# Patient Record
Sex: FEMALE | Race: WHITE
[De-identification: names, ages, dates, MRNs, and addresses within clinical notes are randomized per-mention and may not be internally consistent; named-entity substitution may affect disease eponyms.]

---

## 2019-10-25 ENCOUNTER — HOSPITAL ENCOUNTER (EMERGENCY)
Dept: HOSPITAL 74 - JER | Age: 34
Discharge: HOME | End: 2019-10-25
Payer: COMMERCIAL

## 2019-10-25 VITALS — TEMPERATURE: 98.2 F | SYSTOLIC BLOOD PRESSURE: 122 MMHG | HEART RATE: 88 BPM | DIASTOLIC BLOOD PRESSURE: 78 MMHG

## 2019-10-25 VITALS — BODY MASS INDEX: 34.9 KG/M2

## 2019-10-25 DIAGNOSIS — Z3A.01: ICD-10-CM

## 2019-10-25 DIAGNOSIS — O02.1: ICD-10-CM

## 2019-10-25 DIAGNOSIS — O10.911: ICD-10-CM

## 2019-10-25 DIAGNOSIS — O26.891: Primary | ICD-10-CM

## 2019-10-25 LAB
ALBUMIN SERPL-MCNC: 3.6 G/DL (ref 3.4–5)
ALP SERPL-CCNC: 59 U/L (ref 45–117)
ALT SERPL-CCNC: 19 U/L (ref 13–61)
ANION GAP SERPL CALC-SCNC: 7 MMOL/L (ref 8–16)
APPEARANCE UR: CLEAR
AST SERPL-CCNC: 16 U/L (ref 15–37)
BASOPHILS # BLD: 0.4 % (ref 0–2)
BILIRUB SERPL-MCNC: 0.3 MG/DL (ref 0.2–1)
BILIRUB UR STRIP.AUTO-MCNC: NEGATIVE MG/DL
BUN SERPL-MCNC: 3.6 MG/DL (ref 7–18)
CALCIUM SERPL-MCNC: 8.9 MG/DL (ref 8.5–10.1)
CHLORIDE SERPL-SCNC: 105 MMOL/L (ref 98–107)
CO2 SERPL-SCNC: 24 MMOL/L (ref 21–32)
COLOR UR: YELLOW
CREAT SERPL-MCNC: 0.6 MG/DL (ref 0.55–1.3)
DEPRECATED RDW RBC AUTO: 13.6 % (ref 11.6–15.6)
EOSINOPHIL # BLD: 0.3 % (ref 0–4.5)
GLUCOSE SERPL-MCNC: 118 MG/DL (ref 74–106)
HCT VFR BLD CALC: 38.4 % (ref 32.4–45.2)
HGB BLD-MCNC: 12.9 GM/DL (ref 10.7–15.3)
KETONES UR QL STRIP: NEGATIVE
LEUKOCYTE ESTERASE UR QL STRIP.AUTO: NEGATIVE
LYMPHOCYTES # BLD: 18.9 % (ref 8–40)
MCH RBC QN AUTO: 30.9 PG (ref 25.7–33.7)
MCHC RBC AUTO-ENTMCNC: 33.5 G/DL (ref 32–36)
MCV RBC: 92.2 FL (ref 80–96)
MONOCYTES # BLD AUTO: 6.1 % (ref 3.8–10.2)
NEUTROPHILS # BLD: 74.3 % (ref 42.8–82.8)
NITRITE UR QL STRIP: NEGATIVE
PH UR: >= 9 [PH] (ref 5–8)
PLATELET # BLD AUTO: 230 K/MM3 (ref 134–434)
PMV BLD: 9.3 FL (ref 7.5–11.1)
POTASSIUM SERPLBLD-SCNC: 4 MMOL/L (ref 3.5–5.1)
PROT SERPL-MCNC: 6.8 G/DL (ref 6.4–8.2)
PROT UR QL STRIP: (no result)
PROT UR QL STRIP: NEGATIVE
RBC # BLD AUTO: 4.17 M/MM3 (ref 3.6–5.2)
SODIUM SERPL-SCNC: 136 MMOL/L (ref 136–145)
SP GR UR: 1.02 (ref 1.01–1.03)
UROBILINOGEN UR STRIP-MCNC: 0.2 MG/DL (ref 0.2–1)
WBC # BLD AUTO: 11.7 K/MM3 (ref 4–10)

## 2019-10-25 PROCEDURE — 3E0337Z INTRODUCTION OF ELECTROLYTIC AND WATER BALANCE SUBSTANCE INTO PERIPHERAL VEIN, PERCUTANEOUS APPROACH: ICD-10-PCS | Performed by: EMERGENCY MEDICINE

## 2019-10-25 PROCEDURE — 3E033NZ INTRODUCTION OF ANALGESICS, HYPNOTICS, SEDATIVES INTO PERIPHERAL VEIN, PERCUTANEOUS APPROACH: ICD-10-PCS | Performed by: EMERGENCY MEDICINE

## 2019-10-25 NOTE — PDOC
History of Present Illness





- General


History Source: Patient,  Used ([facility] int #[id number])


Exam Limitations: Clinical Condition





- History of Present Illness


Initial Comments: 





10/25/19 11:50


Patient with past medical history of hypertension on nifedipine and left  

oophorectomy due to left ovarian cyst 5 years ago presented with complaint of 

right lower quadrant and epigastric pain which has been persistent since 

yesterday with a positive pregnancy test.  Patient reported LMP  which 

put her 6 weeks pregnant.  Denies vaginal bleeding, nausea, vomiting, dizziness

, fever or chills.  Patient did not take anything for symptoms.  Denies any 

other symptoms


Is this a multiple visit Asthma Patient?: No


Timing/Duration: other (2 days)





<Arpit Dougherty - Last Filed: 10/25/19 14:54>





<Kyle Lassiter - Last Filed: 10/25/19 16:29>





- General


Chief Complaint: Pain


Stated Complaint: 6 WK PREG / ABD PAIN


Time Seen by Provider: 10/25/19 11:15





Past History





- Past Medical History


COPD: No


HTN: Yes





- Psycho Social/Smoking Cessation Hx


Smoking History: Never smoked





<Arpit Dougherty - Last Filed: 10/25/19 14:54>





<Kyle Lassiter - Last Filed: 10/25/19 16:29>





- Past Medical History


Allergies/Adverse Reactions: 


 Allergies











Allergy/AdvReac Type Severity Reaction Status Date / Time


 


No Known Allergies Allergy   Verified 10/25/19 11:06











Home Medications: 


Ambulatory Orders





Nifedipine [Procardia Xl] 30 mg PO DAILY 10/25/19 


Prenat 115/Iron Fum/Folic/Dss [Prenatal 19 Tablet] 1 each PO DAILY 10/25/19 











**Review of Systems





- Review of Systems


Able to Perform ROS?: Yes


Is the patient limited English proficient: No


Constitutional: No: Fever, Malaise, Weakness


HEENTM: No: Symptoms Reported, See HPI, Eye Pain, Blurred Vision, Tearing, 

Recent change in vision, Double Vision, Cataracts, Ear Pain, Ocular Prothesis, 

Ear Discharge, Nose Pain, Nose Congestion, Tinnitus, Nose Bleeding, Hearing Loss

, Throat Pain, Throat Swelling, Mouth Pain, Dental Problems, Difficulty 

Swallowing, Mouth Swelling, Other


Respiratory: No: Symptoms reported, See HPI, Cough, Orthopnea, Shortness of 

Breath, SOB with Exertion, SOB at Rest, Stridor, Wheezing, Productive cough, 

Hemoptysis, Other


Cardiac (ROS): No: Symptoms Reported, See HPI, Chest Pain, Edema, Irregular 

Heart Rate, Lightheadedness, Palpitations, Syncope, Chest Tightness, Other


ABD/GI: Yes: Symptoms Reported, See HPI, Abdominal cramping (RUQ/RLQ/ 

epigastric tenderness).  No: Abdominal Distended, Abd. Pain w/ defecation, 

Blood Streaked Bowels, Constipated, Difficulty Swallowing, Nausea, Poor Appetite

, Poor Fluid Intake, Rectal Bleeding, Vomiting, Indigestion


: No: Burning, Dysuria, Discharge, Frequency, Flank Pain, Pain, Urgency


Musculoskeletal: No: Symptoms Reported


Integumentary: No: Symptoms Reported


Neurological: No: Symptoms reported, Dizziness


All Other Systems: Reviewed and Negative





<Arpit Dougherty - Last Filed: 10/25/19 14:54>





*Physical Exam





- Vital Signs


 Last Vital Signs











Temp Pulse Resp BP Pulse Ox


 


 98.5 F   106 H  18   124/78   100 


 


 10/25/19 11:06  10/25/19 11:06  10/25/19 11:06  10/25/19 11:06  10/25/19 11:06














- Physical Exam


General Appearance: Yes: Nourished, Appropriately Dressed.  No: Apparent 

Distress


HEENT: positive: JOSIAH, Normal ENT Inspection


Neck: positive: Supple


Respiratory/Chest: positive: Lungs Clear, Normal Breath Sounds.  negative: 

Chest Tender, Respiratory Distress, Accessory Muscle Use


Cardiovascular: positive: Regular Rhythm, Regular Rate


Female Pelvic Exam: positive: normal external exam, cervical os closed, normal 

adnexa, discharge, other (small amount of yellow non-malodorous discharge in 

vaginal vault. no blood in vault).  negative: lesions, adnexal tenderness, 

vaginal bleeding


Gastrointestinal/Abdominal: positive: Normal Bowel Sounds, Tender (mild ttp to 

RLQ, right pelvic area and epigastric areas), Flat, Soft.  negative: 

Organomegaly, Distended, Guarding, Rebound, Hernia, Mass, Hepatomegaly, 

Spleenomegaly


Musculoskeletal: positive: Normal Inspection.  negative: CVA Tenderness


Extremity: positive: Normal Inspection, Normal Range of Motion


Integumentary: positive: Normal Color


Neurologic: positive: Fully Oriented, Alert, Normal Mood/Affect, Normal Response





<Arpit Dougherty - Last Filed: 10/25/19 14:54>





- Vital Signs


 Last Vital Signs











Temp Pulse Resp BP Pulse Ox


 


 98.2 F   88   19   122/78   98 


 


 10/25/19 15:02  10/25/19 15:02  10/25/19 15:02  10/25/19 15:02  10/25/19 15:02














<Kyle Lassiter - Last Filed: 10/25/19 16:29>





ED Treatment Course





- LABORATORY


CBC & Chemistry Diagram: 


 10/25/19 11:34





 10/25/19 11:34





<FarhatArpit - Last Filed: 10/25/19 14:54>





- LABORATORY


CBC & Chemistry Diagram: 


 10/25/19 11:34





 10/25/19 11:34





- ADDITIONAL ORDERS


Additional order review: 


 Laboratory  Results











  10/25/19 10/25/19 10/25/19





  13:25 11:34 11:34


 


Sodium   


 


Potassium   


 


Chloride   


 


Carbon Dioxide   


 


Anion Gap   


 


BUN   


 


Creatinine   


 


Est GFR (CKD-EPI)AfAm   


 


Est GFR (CKD-EPI)NonAf   


 


Random Glucose   


 


Calcium   


 


Total Bilirubin   


 


AST   


 


ALT   


 


Alkaline Phosphatase   


 


Total Protein   


 


Albumin   


 


Lipase    86


 


Beta HCG, Quant   


 


Urine Color  Yellow  


 


Urine Appearance  Clear  


 


Urine pH  >= 9.0 H  


 


Ur Specific Gravity  1.020  


 


Urine Protein  Trace  


 


Urine Glucose (UA)  Negative  


 


Urine Ketones  Negative  


 


Urine Blood  Negative  


 


Urine Nitrite  Negative  


 


Urine Bilirubin  Negative  


 


Urine Urobilinogen  0.2  


 


Ur Leukocyte Esterase  Negative  


 


Blood Type   O POSITIVE 


 


Antibody Screen   Negative 














  10/25/19 10/25/19





  11:34 11:34


 


Sodium  136 


 


Potassium  4.0 


 


Chloride  105 


 


Carbon Dioxide  24 


 


Anion Gap  7 L 


 


BUN  3.6 L 


 


Creatinine  0.6 


 


Est GFR (CKD-EPI)AfAm  139.78 


 


Est GFR (CKD-EPI)NonAf  120.61 


 


Random Glucose  118 H 


 


Calcium  8.9 


 


Total Bilirubin  0.3 


 


AST  16 


 


ALT  19 


 


Alkaline Phosphatase  59 


 


Total Protein  6.8 


 


Albumin  3.6 


 


Lipase  


 


Beta HCG, Quant   10202.1


 


Urine Color  


 


Urine Appearance  


 


Urine pH  


 


Ur Specific Gravity  


 


Urine Protein  


 


Urine Glucose (UA)  


 


Urine Ketones  


 


Urine Blood  


 


Urine Nitrite  


 


Urine Bilirubin  


 


Urine Urobilinogen  


 


Ur Leukocyte Esterase  


 


Blood Type  


 


Antibody Screen  




















 10/25/19 12:00 Gram Stain - Final





 Cervix 








 











  10/25/19





  11:34


 


RBC  4.17


 


MCV  92.2


 


MCHC  33.5


 


RDW  13.6


 


MPV  9.3


 


Neutrophils %  74.3


 


Lymphocytes %  18.9


 


Monocytes %  6.1


 


Eosinophils %  0.3


 


Basophils %  0.4














- Medications


Given in the ED: 


ED Medications














Discontinued Medications














Generic Name Dose Route Start Last Admin





  Trade Name Salvador  PRN Reason Stop Dose Admin


 


Acetaminophen  1,000 mg  10/25/19 14:07  10/25/19 14:25





  Ofirmev Injection -  IVPB  10/25/19 14:08  1,000 mg





  ONCE ONE   Administration





     





     





     





     


 


Sodium Chloride  1,000 mls @ 1,000 mls/hr  10/25/19 12:57  10/25/19 13:50





  Normal Saline -  IV  10/25/19 13:56  1,000 mls/hr





  ASDIR STA   Administration





     





     





     





     














<Kyle Lassiter - Last Filed: 10/25/19 16:29>





Medical Decision Making





- Medical Decision Making





10/25/19 11:52


Patient with past medical history of hypertension on nifedipine and left  

oophorectomy due to left ovarian cyst 5 years ago presented with complaint of 

right lower quadrant and epigastric pain which has been persistent since 

yesterday with a positive pregnancy test.  Patient reported LMP  which 

put her 6 weeks pregnant.  Denies vaginal bleeding, nausea, vomiting, dizziness

, fever or chills.  Patient did not take anything for symptoms.  Denies any 

other symptoms.


Exam significant for mild tenderness to epigastric and right lower quadrant 

region without guarding or rebound.  Small amount of yellow non-malodorous 

discharge in vaginal vault with no cervical motion tenderness.  No vaginal 

bleeding or blood in vaginal vault.  Cervical os closed.


Symptoms likely cramping from pregnancy versus ectopic pregnancy versus 

appendectomy.


CBC, CMP, lipase and beta-hCG labs ordered.  UA urine culture lab ordered.  

Pelvic ultrasound ordered to rule out ectopic pregnancy.  Abdominal ultrasound 

ordered to rule  appendicitis.  genital Cx of vaginal d/c sent. Treat based on 

lab and imaging results





10/25/19 12:43


Beta-hCG is 43,000.  Chemistry lab are unremarkable.  CBC with no significant 

pathology.  Patient pending abdominal ultrasound


10/25/19 13:26


Transvaginal ultrasound shows IUP with crown-rump length of 0.57 cm consistent 

with 6.3 weeks gestational age, no fetal heart rate seen on ultrasound.  

Ultrasound results consistent with fetal demise.  Abdominal ultrasound still 

pending reading


10/25/19 14:54


Abdominal ultrasound shows no appendicitis.  Ultrasound result discussed with 

patient and advised of failed pregnancy.  Patient advised to follow-up with OB 

in 48 hours for repeat beta-hCG or come back to emergency room for repeat blood 

work.  Plan discussed with patient via .  Patient voiced 

understanding of plan and will follow-up with her OB.  Patient stable for 

discharge





<Arpit Dougherty - Last Filed: 10/25/19 14:54>





- Medical Decision Making








10/25/19 16:28


I reviewed the case of the  mid-level practitioner and was available for 

consultation while in the emergency department








<SravaniKyle - Last Filed: 10/25/19 16:29>





Discharge





- Discharge Information


Problems reviewed: Yes





- Admission


No





<Arpit Dougherty - Last Filed: 10/25/19 14:54>





<Kyle Lassiter - Last Filed: 10/25/19 16:29>





- Discharge Information


Clinical Impression/Diagnosis: 


 Missed  with fetal demise before 20 completed weeks of gestation





Condition: Stable


Disposition: HOME





- Follow up/Referral


Referrals: 


German Carrillo MD [Primary Care Provider] - 





- Patient Discharge Instructions


Additional Instructions: 


Your labwork was normal. Your transvaginal ultrasound shows intrauterine 

pregnancy of 6 weeks but no heart rate was seen on ultrasound as discussed. 

Follow-up with your OB in 2-3 days for repeat pregnancy hormones as discussed 

to follow-up with pregnancy. no appendicitis on ultrasound. Take Tylenol as 

needed for pain. Come back to ER for repeat bloodwork if unable to follow-up 

with your OB/GYN.











Tu trabajo de laboratorio fue normal. Lara ultrasonido transvaginal muestra un 

embarazo intrauterino de 6 semanas, donald no se observ frecuencia cardaca en 

el ultrasonido lisbeth se discuti. Veda un seguimiento con lara OB en 2-3 ashraf para 

repetir las hormonas del embarazo lisbeth se discuti para el seguimiento con el 

embarazo. sin apendicitis en la ecografa. [place] Tylenol segn sea necesario 

para el dolor. Regrese a la breann de emergencias para repetir los anlisis de 

sinan si no puede hacer un seguimiento con lara obstetra / gineclogo.

## 2021-03-03 ENCOUNTER — HOSPITAL ENCOUNTER (EMERGENCY)
Dept: HOSPITAL 74 - JER | Age: 36
Discharge: HOME | End: 2021-03-03
Payer: COMMERCIAL

## 2021-03-03 VITALS — HEART RATE: 92 BPM | SYSTOLIC BLOOD PRESSURE: 156 MMHG | DIASTOLIC BLOOD PRESSURE: 90 MMHG | TEMPERATURE: 98.4 F

## 2021-03-03 VITALS — BODY MASS INDEX: 32.3 KG/M2

## 2021-03-03 DIAGNOSIS — R07.89: Primary | ICD-10-CM

## 2021-03-03 LAB
ALBUMIN SERPL-MCNC: 4.1 G/DL (ref 3.4–5)
ALP SERPL-CCNC: 70 U/L (ref 45–117)
ALT SERPL-CCNC: 40 U/L (ref 13–61)
ANION GAP SERPL CALC-SCNC: 5 MMOL/L (ref 8–16)
APTT BLD: 38.7 SECONDS (ref 25.2–36.5)
AST SERPL-CCNC: 25 U/L (ref 15–37)
BASOPHILS # BLD: 0.6 % (ref 0–2)
BILIRUB SERPL-MCNC: 0.3 MG/DL (ref 0.2–1)
BUN SERPL-MCNC: 6.6 MG/DL (ref 7–18)
CALCIUM SERPL-MCNC: 8.5 MG/DL (ref 8.5–10.1)
CHLORIDE SERPL-SCNC: 107 MMOL/L (ref 98–107)
CO2 SERPL-SCNC: 28 MMOL/L (ref 21–32)
CREAT SERPL-MCNC: 0.6 MG/DL (ref 0.55–1.3)
DEPRECATED RDW RBC AUTO: 13.5 % (ref 11.6–15.6)
EOSINOPHIL # BLD: 0.5 % (ref 0–4.5)
GLUCOSE SERPL-MCNC: 108 MG/DL (ref 74–106)
HCT VFR BLD CALC: 44.6 % (ref 32.4–45.2)
HGB BLD-MCNC: 14.7 GM/DL (ref 10.7–15.3)
INR BLD: 1 (ref 0.83–1.09)
LYMPHOCYTES # BLD: 31.1 % (ref 8–40)
MCH RBC QN AUTO: 30.7 PG (ref 25.7–33.7)
MCHC RBC AUTO-ENTMCNC: 32.9 G/DL (ref 32–36)
MCV RBC: 93.1 FL (ref 80–96)
MONOCYTES # BLD AUTO: 11 % (ref 3.8–10.2)
NEUTROPHILS # BLD: 56.8 % (ref 42.8–82.8)
PLATELET # BLD AUTO: 168 K/MM3 (ref 134–434)
PMV BLD: 10.5 FL (ref 7.5–11.1)
POTASSIUM SERPLBLD-SCNC: 4.4 MMOL/L (ref 3.5–5.1)
PROT SERPL-MCNC: 7.5 G/DL (ref 6.4–8.2)
PT PNL PPP: 12.3 SEC (ref 9.7–13)
RBC # BLD AUTO: 4.79 M/MM3 (ref 3.6–5.2)
SODIUM SERPL-SCNC: 140 MMOL/L (ref 136–145)
WBC # BLD AUTO: 6.3 K/MM3 (ref 4–10)

## 2021-03-03 PROCEDURE — 3E033NZ INTRODUCTION OF ANALGESICS, HYPNOTICS, SEDATIVES INTO PERIPHERAL VEIN, PERCUTANEOUS APPROACH: ICD-10-PCS

## 2021-06-21 ENCOUNTER — HOSPITAL ENCOUNTER (EMERGENCY)
Dept: HOSPITAL 74 - JER | Age: 36
Discharge: HOME | End: 2021-06-21
Payer: COMMERCIAL

## 2021-06-21 VITALS — DIASTOLIC BLOOD PRESSURE: 77 MMHG | SYSTOLIC BLOOD PRESSURE: 136 MMHG | TEMPERATURE: 98 F | HEART RATE: 88 BPM

## 2021-06-21 VITALS — BODY MASS INDEX: 33.1 KG/M2

## 2021-06-21 DIAGNOSIS — S76.011A: Primary | ICD-10-CM

## 2021-06-21 PROCEDURE — 3E0233Z INTRODUCTION OF ANTI-INFLAMMATORY INTO MUSCLE, PERCUTANEOUS APPROACH: ICD-10-PCS | Performed by: NURSE PRACTITIONER

## 2021-08-18 ENCOUNTER — HOSPITAL ENCOUNTER (EMERGENCY)
Dept: HOSPITAL 74 - JER | Age: 36
Discharge: HOME | End: 2021-08-18
Payer: COMMERCIAL

## 2021-08-18 VITALS — HEART RATE: 99 BPM | TEMPERATURE: 98 F | SYSTOLIC BLOOD PRESSURE: 119 MMHG | DIASTOLIC BLOOD PRESSURE: 81 MMHG

## 2021-08-18 VITALS — BODY MASS INDEX: 31.6 KG/M2

## 2021-08-18 DIAGNOSIS — H11.31: Primary | ICD-10-CM

## 2021-09-07 ENCOUNTER — HOSPITAL ENCOUNTER (EMERGENCY)
Dept: HOSPITAL 74 - JER | Age: 36
LOS: 1 days | Discharge: HOME | End: 2021-09-08
Payer: COMMERCIAL

## 2021-09-07 VITALS — BODY MASS INDEX: 32.3 KG/M2

## 2021-09-07 VITALS — SYSTOLIC BLOOD PRESSURE: 148 MMHG | DIASTOLIC BLOOD PRESSURE: 95 MMHG | HEART RATE: 93 BPM | TEMPERATURE: 98.5 F

## 2021-09-07 DIAGNOSIS — Z3A.01: ICD-10-CM

## 2021-09-07 DIAGNOSIS — O26.851: Primary | ICD-10-CM

## 2021-09-07 DIAGNOSIS — O02.0: ICD-10-CM

## 2021-09-07 LAB
ANION GAP SERPL CALC-SCNC: 3 MMOL/L (ref 8–16)
APPEARANCE UR: CLEAR
BASOPHILS # BLD: 0.3 % (ref 0–2)
BILIRUB UR STRIP.AUTO-MCNC: NEGATIVE MG/DL
BUN SERPL-MCNC: 8.2 MG/DL (ref 7–18)
CALCIUM SERPL-MCNC: 8.6 MG/DL (ref 8.5–10.1)
CHLORIDE SERPL-SCNC: 106 MMOL/L (ref 98–107)
CO2 SERPL-SCNC: 28 MMOL/L (ref 21–32)
COLOR UR: YELLOW
CREAT SERPL-MCNC: 0.7 MG/DL (ref 0.55–1.3)
DEPRECATED RDW RBC AUTO: 13.1 % (ref 11.6–15.6)
EOSINOPHIL # BLD: 0.4 % (ref 0–4.5)
GLUCOSE SERPL-MCNC: 93 MG/DL (ref 74–106)
HCT VFR BLD CALC: 37.7 % (ref 32.4–45.2)
HGB BLD-MCNC: 12.8 GM/DL (ref 10.7–15.3)
KETONES UR QL STRIP: NEGATIVE
LEUKOCYTE ESTERASE UR QL STRIP.AUTO: NEGATIVE
LYMPHOCYTES # BLD: 19.9 % (ref 8–40)
MCH RBC QN AUTO: 30.7 PG (ref 25.7–33.7)
MCHC RBC AUTO-ENTMCNC: 33.8 G/DL (ref 32–36)
MCV RBC: 90.8 FL (ref 80–96)
MONOCYTES # BLD AUTO: 8.3 % (ref 3.8–10.2)
NEUTROPHILS # BLD: 71.1 % (ref 42.8–82.8)
NITRITE UR QL STRIP: NEGATIVE
PH UR: 7 [PH] (ref 5–8)
PLATELET # BLD AUTO: 211 10^3/UL (ref 134–434)
PMV BLD: 9.3 FL (ref 7.5–11.1)
PROT UR QL STRIP: NEGATIVE
PROT UR QL STRIP: NEGATIVE
RBC # BLD AUTO: 4.15 M/MM3 (ref 3.6–5.2)
SODIUM SERPL-SCNC: 137 MMOL/L (ref 136–145)
SP GR UR: 1.02 (ref 1.01–1.03)
UROBILINOGEN UR STRIP-MCNC: 0.2 MG/DL (ref 0.2–1)
WBC # BLD AUTO: 11.8 K/MM3 (ref 4–10)

## 2022-06-06 ENCOUNTER — HOSPITAL ENCOUNTER (EMERGENCY)
Dept: HOSPITAL 74 - JERFT | Age: 37
Discharge: HOME | End: 2022-06-06
Payer: COMMERCIAL

## 2022-06-06 VITALS — BODY MASS INDEX: 32.3 KG/M2

## 2022-06-06 VITALS — DIASTOLIC BLOOD PRESSURE: 79 MMHG | SYSTOLIC BLOOD PRESSURE: 120 MMHG | HEART RATE: 76 BPM

## 2022-06-06 VITALS — TEMPERATURE: 98.6 F

## 2022-06-06 DIAGNOSIS — R10.30: Primary | ICD-10-CM

## 2022-06-06 LAB
ALBUMIN SERPL-MCNC: 4.2 G/DL (ref 3.4–5)
ALP SERPL-CCNC: 65 U/L (ref 45–117)
ALT SERPL-CCNC: 24 U/L (ref 13–61)
ANION GAP SERPL CALC-SCNC: 6 MMOL/L (ref 8–16)
APPEARANCE UR: CLEAR
AST SERPL-CCNC: 14 U/L (ref 15–37)
BASOPHILS # BLD: 0.2 % (ref 0–2)
BILIRUB SERPL-MCNC: 0.4 MG/DL (ref 0.2–1)
BILIRUB UR STRIP.AUTO-MCNC: NEGATIVE MG/DL
BUN SERPL-MCNC: 5.8 MG/DL (ref 7–18)
CALCIUM SERPL-MCNC: 8.9 MG/DL (ref 8.5–10.1)
CHLORIDE SERPL-SCNC: 105 MMOL/L (ref 98–107)
CO2 SERPL-SCNC: 26 MMOL/L (ref 21–32)
COLOR UR: YELLOW
CREAT SERPL-MCNC: 0.6 MG/DL (ref 0.55–1.3)
DEPRECATED RDW RBC AUTO: 13.1 % (ref 11.6–15.6)
EOSINOPHIL # BLD: 0 % (ref 0–4.5)
GLUCOSE SERPL-MCNC: 94 MG/DL (ref 74–106)
HCT VFR BLD CALC: 40.4 % (ref 32.4–45.2)
HGB BLD-MCNC: 13.3 GM/DL (ref 10.7–15.3)
KETONES UR QL STRIP: NEGATIVE
LEUKOCYTE ESTERASE UR QL STRIP.AUTO: NEGATIVE
LYMPHOCYTES # BLD: 4.3 % (ref 8–40)
MCH RBC QN AUTO: 30.2 PG (ref 25.7–33.7)
MCHC RBC AUTO-ENTMCNC: 32.8 G/DL (ref 32–36)
MCV RBC: 92.1 FL (ref 80–96)
MONOCYTES # BLD AUTO: 7.7 % (ref 3.8–10.2)
NEUTROPHILS # BLD: 87.8 % (ref 42.8–82.8)
NITRITE UR QL STRIP: NEGATIVE
PH UR: 8.5 [PH] (ref 5–8)
PLATELET # BLD AUTO: 228 10^3/UL (ref 134–434)
PMV BLD: 9.2 FL (ref 7.5–11.1)
PROT SERPL-MCNC: 7.6 G/DL (ref 6.4–8.2)
PROT UR QL STRIP: NEGATIVE
PROT UR QL STRIP: NEGATIVE
RBC # BLD AUTO: 4.39 M/MM3 (ref 3.6–5.2)
SODIUM SERPL-SCNC: 137 MMOL/L (ref 136–145)
SP GR UR: 1.02 (ref 1.01–1.03)
UROBILINOGEN UR STRIP-MCNC: 0.2 MG/DL (ref 0.2–1)
WBC # BLD AUTO: 13.2 K/MM3 (ref 4–10)

## 2022-06-07 ENCOUNTER — HOSPITAL ENCOUNTER (EMERGENCY)
Dept: HOSPITAL 74 - JER | Age: 37
LOS: 1 days | Discharge: HOME | End: 2022-06-08
Payer: COMMERCIAL

## 2022-06-07 VITALS — BODY MASS INDEX: 33.7 KG/M2

## 2022-06-07 VITALS — TEMPERATURE: 98.1 F

## 2022-06-07 DIAGNOSIS — O26.899: Primary | ICD-10-CM

## 2022-06-07 DIAGNOSIS — Z3A.00: ICD-10-CM

## 2022-06-07 DIAGNOSIS — R10.84: ICD-10-CM

## 2022-06-08 VITALS — SYSTOLIC BLOOD PRESSURE: 123 MMHG | DIASTOLIC BLOOD PRESSURE: 78 MMHG | HEART RATE: 90 BPM

## 2022-12-04 ENCOUNTER — HOSPITAL ENCOUNTER (EMERGENCY)
Dept: HOSPITAL 74 - JER | Age: 37
Discharge: HOME | End: 2022-12-04
Payer: COMMERCIAL

## 2022-12-04 VITALS
HEART RATE: 83 BPM | RESPIRATION RATE: 16 BRPM | SYSTOLIC BLOOD PRESSURE: 148 MMHG | TEMPERATURE: 98 F | DIASTOLIC BLOOD PRESSURE: 67 MMHG

## 2022-12-04 VITALS — BODY MASS INDEX: 22.4 KG/M2

## 2022-12-04 DIAGNOSIS — Z34.90: Primary | ICD-10-CM

## 2022-12-04 LAB
ALBUMIN SERPL-MCNC: 3.9 G/DL (ref 3.4–5)
ALP SERPL-CCNC: 64 U/L (ref 45–117)
ALT SERPL-CCNC: 23 U/L (ref 13–61)
ANION GAP SERPL CALC-SCNC: 10 MMOL/L (ref 8–16)
AST SERPL-CCNC: 13 U/L (ref 15–37)
BASOPHILS # BLD: 0.3 % (ref 0–2)
BILIRUB SERPL-MCNC: 0.4 MG/DL (ref 0.2–1)
BUN SERPL-MCNC: 6.3 MG/DL (ref 7–18)
CALCIUM SERPL-MCNC: 9.1 MG/DL (ref 8.5–10.1)
CHLORIDE SERPL-SCNC: 106 MMOL/L (ref 98–107)
CO2 SERPL-SCNC: 25 MMOL/L (ref 21–32)
CREAT SERPL-MCNC: 0.6 MG/DL (ref 0.55–1.3)
DEPRECATED RDW RBC AUTO: 13 % (ref 11.6–15.6)
EOSINOPHIL # BLD: 0.2 % (ref 0–4.5)
GLUCOSE SERPL-MCNC: 89 MG/DL (ref 74–106)
HCT VFR BLD CALC: 40.7 % (ref 32.4–45.2)
HGB BLD-MCNC: 13.3 GM/DL (ref 10.7–15.3)
LYMPHOCYTES # BLD: 23.7 % (ref 8–40)
MCH RBC QN AUTO: 30 PG (ref 25.7–33.7)
MCHC RBC AUTO-ENTMCNC: 32.5 G/DL (ref 32–36)
MCV RBC: 92.1 FL (ref 80–96)
MONOCYTES # BLD AUTO: 6.6 % (ref 3.8–10.2)
NEUTROPHILS # BLD: 69.2 % (ref 42.8–82.8)
PLATELET # BLD AUTO: 267 10^3/UL (ref 134–434)
PMV BLD: 9.2 FL (ref 7.5–11.1)
PROT SERPL-MCNC: 7.1 G/DL (ref 6.4–8.2)
RBC # BLD AUTO: 4.42 M/MM3 (ref 3.6–5.2)
SODIUM SERPL-SCNC: 141 MMOL/L (ref 136–145)
WBC # BLD AUTO: 12.6 K/MM3 (ref 4–10)